# Patient Record
Sex: FEMALE | URBAN - METROPOLITAN AREA
[De-identification: names, ages, dates, MRNs, and addresses within clinical notes are randomized per-mention and may not be internally consistent; named-entity substitution may affect disease eponyms.]

---

## 2023-09-14 ENCOUNTER — OFFICE VISIT (OUTPATIENT)
Dept: URBAN - METROPOLITAN AREA CLINIC 80 | Facility: CLINIC | Age: 63
End: 2023-09-14
Payer: COMMERCIAL

## 2023-09-14 VITALS
TEMPERATURE: 97.1 F | SYSTOLIC BLOOD PRESSURE: 120 MMHG | BODY MASS INDEX: 22.39 KG/M2 | WEIGHT: 126.4 LBS | DIASTOLIC BLOOD PRESSURE: 70 MMHG | HEART RATE: 73 BPM | HEIGHT: 63 IN

## 2023-09-14 DIAGNOSIS — R19.5 HEME + STOOL: ICD-10-CM

## 2023-09-14 PROCEDURE — 99203 OFFICE O/P NEW LOW 30 MIN: CPT | Performed by: INTERNAL MEDICINE

## 2023-09-15 ENCOUNTER — OFFICE VISIT (OUTPATIENT)
Dept: URBAN - METROPOLITAN AREA CLINIC 80 | Facility: CLINIC | Age: 63
End: 2023-09-15

## 2023-09-25 ENCOUNTER — OUT OF OFFICE VISIT (OUTPATIENT)
Dept: URBAN - METROPOLITAN AREA SURGERY CENTER 19 | Facility: SURGERY CENTER | Age: 63
End: 2023-09-25
Payer: COMMERCIAL

## 2023-09-25 ENCOUNTER — CLAIMS CREATED FROM THE CLAIM WINDOW (OUTPATIENT)
Dept: URBAN - METROPOLITAN AREA CLINIC 4 | Facility: CLINIC | Age: 63
End: 2023-09-25
Payer: COMMERCIAL

## 2023-09-25 DIAGNOSIS — K64.8 INTERNAL HEMORRHOIDS: ICD-10-CM

## 2023-09-25 DIAGNOSIS — K51.911 ULCERATIVE COLITIS, UNSPECIFIED WITH RECTAL BLEEDING: ICD-10-CM

## 2023-09-25 DIAGNOSIS — K62.89 ACUTE PROCTITIS: ICD-10-CM

## 2023-09-25 DIAGNOSIS — R19.5 HEME + STOOL: ICD-10-CM

## 2023-09-25 DIAGNOSIS — K62.6: ICD-10-CM

## 2023-09-25 DIAGNOSIS — K62.1 POLYP OF RECTUM: ICD-10-CM

## 2023-09-25 DIAGNOSIS — K62.1 ANAL AND RECTAL POLYP: ICD-10-CM

## 2023-09-25 PROCEDURE — 00811 ANES LWR INTST NDSC NOS: CPT | Performed by: NURSE ANESTHETIST, CERTIFIED REGISTERED

## 2023-09-25 PROCEDURE — 45380 COLONOSCOPY AND BIOPSY: CPT | Performed by: INTERNAL MEDICINE

## 2023-09-25 PROCEDURE — G8907 PT DOC NO EVENTS ON DISCHARG: HCPCS | Performed by: INTERNAL MEDICINE

## 2023-09-25 PROCEDURE — 88341 IMHCHEM/IMCYTCHM EA ADD ANTB: CPT | Performed by: PATHOLOGY

## 2023-09-25 PROCEDURE — 88342 IMHCHEM/IMCYTCHM 1ST ANTB: CPT | Performed by: PATHOLOGY

## 2023-09-25 PROCEDURE — 88305 TISSUE EXAM BY PATHOLOGIST: CPT | Performed by: PATHOLOGY

## 2023-10-02 ENCOUNTER — TELEPHONE ENCOUNTER (OUTPATIENT)
Dept: URBAN - METROPOLITAN AREA CLINIC 80 | Facility: CLINIC | Age: 63
End: 2023-10-02

## 2023-10-02 RX ORDER — MESALAMINE 4 G/60ML
4 GM SUSPENSION RECTAL DAILY
Qty: 14 | Refills: 1 | OUTPATIENT
Start: 2023-10-02 | End: 2023-10-30

## 2023-11-02 ENCOUNTER — OFFICE VISIT (OUTPATIENT)
Dept: URBAN - METROPOLITAN AREA CLINIC 80 | Facility: CLINIC | Age: 63
End: 2023-11-02
Payer: COMMERCIAL

## 2023-11-02 VITALS — WEIGHT: 124.2 LBS | HEIGHT: 63 IN | BODY MASS INDEX: 22.01 KG/M2 | TEMPERATURE: 97.8 F

## 2023-11-02 DIAGNOSIS — K62.89 PROCTITIS: ICD-10-CM

## 2023-11-02 PROCEDURE — 99214 OFFICE O/P EST MOD 30 MIN: CPT | Performed by: INTERNAL MEDICINE

## 2023-11-02 RX ORDER — MESALAMINE 4 G/60ML
4GM SUSPENSION RECTAL DAILY
Qty: 14 | Refills: 1 | OUTPATIENT
Start: 2023-11-02 | End: 2023-11-29

## 2023-11-21 ENCOUNTER — TELEPHONE ENCOUNTER (OUTPATIENT)
Dept: URBAN - METROPOLITAN AREA CLINIC 80 | Facility: CLINIC | Age: 63
End: 2023-11-21

## 2024-01-11 ENCOUNTER — DASHBOARD ENCOUNTERS (OUTPATIENT)
Age: 64
End: 2024-01-11

## 2024-01-11 ENCOUNTER — OFFICE VISIT (OUTPATIENT)
Dept: URBAN - METROPOLITAN AREA CLINIC 80 | Facility: CLINIC | Age: 64
End: 2024-01-11
Payer: COMMERCIAL

## 2024-01-11 VITALS
SYSTOLIC BLOOD PRESSURE: 121 MMHG | TEMPERATURE: 97.1 F | WEIGHT: 120.2 LBS | HEIGHT: 63 IN | HEART RATE: 69 BPM | DIASTOLIC BLOOD PRESSURE: 70 MMHG | BODY MASS INDEX: 21.3 KG/M2

## 2024-01-11 DIAGNOSIS — K62.89 PROCTITIS: ICD-10-CM

## 2024-01-11 PROCEDURE — 99214 OFFICE O/P EST MOD 30 MIN: CPT | Performed by: INTERNAL MEDICINE

## 2024-01-11 RX ORDER — MESALAMINE 4 G/60ML
4 GM SUSPENSION RECTAL DAILY
Qty: 30 | Refills: 1 | OUTPATIENT
Start: 2024-01-11 | End: 2024-03-11

## 2024-08-13 ENCOUNTER — OFFICE VISIT (OUTPATIENT)
Dept: URBAN - METROPOLITAN AREA CLINIC 80 | Facility: CLINIC | Age: 64
End: 2024-08-13
Payer: COMMERCIAL

## 2024-08-13 VITALS
HEART RATE: 76 BPM | BODY MASS INDEX: 21.09 KG/M2 | DIASTOLIC BLOOD PRESSURE: 80 MMHG | HEIGHT: 63 IN | SYSTOLIC BLOOD PRESSURE: 120 MMHG | WEIGHT: 119 LBS

## 2024-08-13 DIAGNOSIS — K51.211 ULCERATIVE PROCTITIS WITH RECTAL BLEEDING: ICD-10-CM

## 2024-08-13 PROBLEM — 10811000202109: Status: ACTIVE | Noted: 2024-08-13

## 2024-08-13 PROCEDURE — 99213 OFFICE O/P EST LOW 20 MIN: CPT | Performed by: PHYSICIAN ASSISTANT

## 2024-08-13 RX ORDER — MESALAMINE 375 MG/1
4 CAPSULES IN THE MORNING CAPSULE, EXTENDED RELEASE ORAL ONCE A DAY
Qty: 120 | Refills: 11 | OUTPATIENT
Start: 2024-08-13 | End: 2025-08-08

## 2024-08-13 RX ORDER — PREDNISONE 10 MG/1
TAKE 4 TABS PO X 1 WEEK, TAKE 3 TABS PO X 1 WEEK, TAKE 2 TABS PO X 1 WEEK, TAKE 1 TAB PO X 1 WEEK TABLET ORAL ONCE A DAY
Qty: 70 | Refills: 0 | OUTPATIENT
Start: 2024-08-13 | End: 2024-09-12

## 2024-08-13 NOTE — HPI-ZZZTODAY'S VISIT
[facility] Int #[id number] Patient had a colonoscopy done in September 2023 showing mucosal ulcerations in the rectum that were biopsied, 1 polyp in the rectum and internal hemorrhoids.  Pathology showed diffuse chronic active proctitis consistent with ulcerative colitis and mucosal prolapse polyp.  She was advised to take Rowasa enemas.  She had blood work done on July 11, 2024 showing hemoglobin of 13.9, hematocrit 42.5, platelets 298, MCV 89.7, normal liver enzymes, hemoglobin A1c 6.4.  She was last seen in our office in January of this year with some rectal bleeding and put back on the Rowasa enemas daily. Pt having rectal bleeding - she states it never got better  she was using the enemas but it never helped she is having 1 BM a day - sees blood every week but not every day stool is formed most of the time - can at times be diarrhea sometimes has rectal pain when sitting down - not all the time she sometimes has abd pain when she has a BM no nausea or emesis no fevers but does feel chilled a lot 2 weeks ago she started with burning in her back and down her legs - both legs

## 2024-08-23 ENCOUNTER — TELEPHONE ENCOUNTER (OUTPATIENT)
Dept: URBAN - METROPOLITAN AREA CLINIC 80 | Facility: CLINIC | Age: 64
End: 2024-08-23

## 2025-07-15 ENCOUNTER — OFFICE VISIT (OUTPATIENT)
Dept: URBAN - METROPOLITAN AREA CLINIC 19 | Facility: CLINIC | Age: 65
End: 2025-07-15
Payer: COMMERCIAL

## 2025-07-15 DIAGNOSIS — K51.211 ULCERATIVE PROCTITIS WITH RECTAL BLEEDING: ICD-10-CM

## 2025-07-15 PROCEDURE — 99215 OFFICE O/P EST HI 40 MIN: CPT | Performed by: NURSE PRACTITIONER

## 2025-07-15 RX ORDER — GLIMEPIRIDE 4 MG/1
1 TABLET WITH BREAKFAST OR THE FIRST MAIN MEAL OF THE DAY TABLET ORAL ONCE A DAY
Status: ACTIVE | COMMUNITY

## 2025-07-15 RX ORDER — MESALAMINE 375 MG/1
4 CAPSULES IN THE MORNING CAPSULE, EXTENDED RELEASE ORAL ONCE A DAY
Qty: 120 | Refills: 11 | Status: DISCONTINUED | COMMUNITY
Start: 2024-08-13 | End: 2025-08-08

## 2025-07-15 RX ORDER — MESALAMINE 375 MG/1
4 CAPSULES IN THE MORNING CAPSULE, EXTENDED RELEASE ORAL ONCE A DAY
Qty: 120 | Refills: 11
Start: 2024-08-13

## 2025-07-15 RX ORDER — MESALAMINE 1000 MG/1
1 SUPPOSITORY AT BEDTIME SUPPOSITORY RECTAL ONCE A DAY
Qty: 30 | Refills: 1 | OUTPATIENT
Start: 2025-07-15

## 2025-07-15 NOTE — HPI-TODAY'S VISIT:
65-year-old female with PMH of ulcerative proctitis presents for colonoscopy. She was last seen in GI clinic in 2024 by Alpesh Schrader  She had a colonoscopy done September 2023 showing mucosal ulcerations in the rectum that were biopsied, 1 polyp in the rectum and internal hemorrhoids.  Path: Diffuse chronic active proctitis consistent with UC and mucosal prolapse polyp.  She was advised to take Rowasa enemas but reported no relief with it. Labs from 7/11/2024: Hg 13.9, , MCV 89.7.  Normal LFTs.  She was prescribed steroid taper and oral mesalamine but she reports she does not recall ever picking up the prescription  She reports having a lot of rectal bleeding intermittently ongoing since last year No abdominal pain or diarrhea. Reports more constipation. Her PCP just started her on Prednisone taper as of 2 weeks ago.